# Patient Record
Sex: FEMALE | Race: WHITE
[De-identification: names, ages, dates, MRNs, and addresses within clinical notes are randomized per-mention and may not be internally consistent; named-entity substitution may affect disease eponyms.]

---

## 2019-09-05 ENCOUNTER — HOSPITAL ENCOUNTER (EMERGENCY)
Dept: HOSPITAL 33 - ED | Age: 29
Discharge: HOME | End: 2019-09-05
Payer: COMMERCIAL

## 2019-09-05 VITALS — DIASTOLIC BLOOD PRESSURE: 58 MMHG | SYSTOLIC BLOOD PRESSURE: 105 MMHG | HEART RATE: 118 BPM | OXYGEN SATURATION: 98 %

## 2019-09-05 DIAGNOSIS — J20.9: ICD-10-CM

## 2019-09-05 DIAGNOSIS — E87.6: Primary | ICD-10-CM

## 2019-09-05 LAB
ALBUMIN SERPL-MCNC: 3.9 G/DL (ref 3.5–5)
ALP SERPL-CCNC: 104 U/L (ref 38–126)
ALT SERPL-CCNC: 33 U/L (ref 0–35)
ANION GAP SERPL CALC-SCNC: 12 MEQ/L (ref 5–15)
AST SERPL QL: 24 U/L (ref 14–36)
BASOPHILS # BLD AUTO: 0.01 10*3/UL (ref 0–0.4)
BASOPHILS NFR BLD AUTO: 0.2 % (ref 0–0.4)
BILIRUB BLD-MCNC: 0.4 MG/DL (ref 0.2–1.3)
BUN SERPL-MCNC: 9 MG/DL (ref 7–17)
CALCIUM SPEC-MCNC: 9.1 MG/DL (ref 8.4–10.2)
CHLORIDE SERPL-SCNC: 108 MMOL/L (ref 98–107)
CO2 SERPL-SCNC: 24 MMOL/L (ref 22–30)
CREAT SERPL-MCNC: 0.2 MG/DL (ref 0.52–1.04)
EOSINOPHIL # BLD AUTO: 0.07 10*3/UL (ref 0–0.5)
GLUCOSE SERPL-MCNC: 112 MG/DL (ref 74–106)
GRANULOCYTES # BLD AUTO: 4.11 10*3/UL (ref 1.4–6.9)
HCT VFR BLD AUTO: 36.5 % (ref 35–47)
HGB BLD-MCNC: 11.7 GM/DL (ref 12–16)
LYMPHOCYTES # SPEC AUTO: 0.83 10*3/UL (ref 1–4.6)
MCH RBC QN AUTO: 28.1 PG (ref 26–32)
MCHC RBC AUTO-ENTMCNC: 32.1 G/DL (ref 32–36)
MONOCYTES # BLD AUTO: 0.2 10*3/UL (ref 0–1.3)
NEUTROPHILS NFR BLD AUTO: 78.8 % (ref 36–66)
PLATELET # BLD AUTO: 204 K/MM3 (ref 150–450)
POTASSIUM SERPLBLD-SCNC: 3.2 MMOL/L (ref 3.5–5.1)
PROT SERPL-MCNC: 7.6 G/DL (ref 6.3–8.2)
RBC # BLD AUTO: 4.15 M/MM3 (ref 4.1–5.4)
SODIUM SERPL-SCNC: 141 MMOL/L (ref 137–145)
WBC # BLD AUTO: 5.2 K/MM3 (ref 4–10.5)

## 2019-09-05 PROCEDURE — 80053 COMPREHEN METABOLIC PANEL: CPT

## 2019-09-05 PROCEDURE — 94640 AIRWAY INHALATION TREATMENT: CPT

## 2019-09-05 PROCEDURE — 36415 COLL VENOUS BLD VENIPUNCTURE: CPT

## 2019-09-05 PROCEDURE — 85025 COMPLETE CBC W/AUTO DIFF WBC: CPT

## 2019-09-05 PROCEDURE — 83605 ASSAY OF LACTIC ACID: CPT

## 2019-09-05 PROCEDURE — 71045 X-RAY EXAM CHEST 1 VIEW: CPT

## 2019-09-05 PROCEDURE — 99284 EMERGENCY DEPT VISIT MOD MDM: CPT

## 2019-09-05 RX ADMIN — IPRATROPIUM BROMIDE AND ALBUTEROL SULFATE ONE ML: .5; 3 SOLUTION RESPIRATORY (INHALATION) at 05:22

## 2019-09-05 RX ADMIN — IPRATROPIUM BROMIDE AND ALBUTEROL SULFATE ONE ML: .5; 3 SOLUTION RESPIRATORY (INHALATION) at 04:01

## 2019-09-05 RX ADMIN — POTASSIUM CHLORIDE ONE MEQ: 10 TABLET, EXTENDED RELEASE ORAL at 06:03

## 2019-09-05 NOTE — XRAY
Indication: Dyspnea.



Comparison: February 22, 2016.



Portable chest remains clear.  Heart is not enlarged.  Bony thorax intact

again with left shoulder arthroplasty.  No new/acute findings.

## 2019-09-05 NOTE — ERPHSYRPT
- History of Present Illness


Time Seen by Provider: 09/05/19 03:40


Exam Limitations: no limitations


Patient Subjective Stated Complaint: Pt states, "I've been having trouble 

breathing, been sob off and on for a week and a half and it got alot worse 

tonight".


Triage Nursing Assessment: pt brought in by ambulance to rm 5, pt alert and 

oriented x3, pleasant.  Lungs coarse with rhonchi noted throughout ant and 

post.  pt on rm air, O2 sats 97%.  Heart tones reg, abd soft/round with active 

bs x4 quad.  Pt is a paraplegic, has urostomy tube draining clear, yellow urine.


Physician History: 





patient has had increasing shortness of breath over the past 2 days, with 

increasing wheezing over the past day.  Patient was brought into the emergency 

department by EMS where she received a breathing treatment and IV steroids.  

Patient has tried albuterol treatments at home with some relief.  Patient is a 

smoker.


Timing/Duration: day(s), gradual onset, worse


Activities at Onset: rest


Severity of Dyspnea-Max: moderate


Severity of Dyspnea-Current: none


Possible Cause: no prior episodes


Modifying Factors: Improves With: albuterol nebulizer, deep breath


Associated Symptoms: cough, wheezing, tightness, No chest pain/discomfort, No 

edema, No fever, No loss of appetite, No lightheadedness, No weakness, No 

hemoptysis, No dizziness, No lightheadedness, No painful breathing, No 

productive cough


Allergies/Adverse Reactions: 








tramadol Allergy (Intermediate, Verified 07/30/16 00:22)


 


 SEIZURES 


duloxetine HCl [From Cymbalta] Allergy (Verified 07/30/16 00:22)


 


latex Allergy (Verified 07/30/16 00:22)


 





Home Medications: 








Amphet Asp/Amphet/D-Amphet [Adderall 30 mg Tablet] 30 mg PO DAILY 03/23/15 [

History]


Pregabalin [Lyrica] 300 mg PO BID 03/23/15 [History]


Baclofen 10 mg*** [Lioresal 10 mg***] 10 mg PO QID 04/29/15 [History]


Ondansetron [Zofran Odt] 8 mg PO Q6HPRN PRN 10/23/15 [History]


Polyethylene Glycol 3350 17 gm [Miralax Powder 17GM PACKET***] 17 gm PO DAILY 

PRN PRN 10/23/15 [History]


Albuterol 2.5 mg/3 ml Neb*** [Proventil 2.5 mg/3 ml Neb***] 2.5 mg IH QIDPRN 

PRN 02/22/16 [History]


Albuterol Common Canister*** [Proventil Common Canister***] 1 puff IH QIDPRN 

PRN 02/22/16 [History]





Hx Tetanus, Diphtheria Vaccination/Date Given: Yes


Hx Influenza Vaccination/Date Given: No


Hx Pneumococcal Vaccination/Date Given: No


Immunizations Up to Date: Yes





- Review of Systems


Constitutional: No Fever, No Chills


Eyes: No Symptoms, No Discharge, No Eye Pain


Ears, Nose, & Throat: No Symptoms, Nose Congestion, Sinus Drainage, No Throat 

Pain, No Painful Swallowing


Respiratory: Cough, Dyspnea, Wheezing


Cardiac: No Chest Pain, No Edema, No Syncope


Abdominal/Gastrointestinal: No Abdominal Pain, No Nausea, No Vomiting, No 

Diarrhea


Genitourinary Symptoms: No Flank Pain


Musculoskeletal: No Back Pain, No Neck Pain


Skin: No Rash


Neurological: No Dizziness, No Focal Weakness, No Sensory Changes


Psychological: No Symptoms


Endocrine: No Symptoms


Hematologic/Lymphatic: No Easy Bleeding, No Easy Bruising


All Other Systems: Reviewed and Negative





- Past Medical History


Pertinent Past Medical History: Yes


Neurological History: Paralysis


ENT History: No Pertinent History


Cardiac History: No Pertinent History


Respiratory History: Pneumonia


Endocrine Medical History: No Pertinent History


Musculoskeletal History: Other


GI Medical History: Colitis


 History: Other


Psycho-Social History: No Pertinent History


Female Reproductive Disorders: No Pertinent History


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 

spinal cord, urostomy, Hepatitis C





- Past Surgical History


Past Surgical History: Yes


Neuro Surgical History: No Pertinent History


Cardiac: No Pertinent History


Respiratory: No Pertinent History


Gastrointestinal: No Pertinent History


Genitourinary: No Pertinent History


Musculoskeletal: Joint Replacement


Female Surgical History: No Pertinent History


Other Surgical History: DENTAL EXTRACTION - TONSILS - LEFT SHOULDER REPLACEMENT

, MACE AND MUSA PROCEDURE, rt hip replacement





- Social History


Smoking Status: Current every day smoker


How long have you smoked: 6 yrs


Exposure to second hand smoke: Yes


Drug Use: none


Patient Lives Alone: No





- Female History


Hx Last Menstrual Period: 08/31/19


Hx Pregnant Now: No





- Nursing Vital Signs


Nursing Vital Signs: 


 Initial Vital Signs











Temperature  98.0 F   09/05/19 03:12


 


Pulse Rate  115 H  09/05/19 03:12


 


Respiratory Rate  20   09/05/19 03:12


 


Blood Pressure  113/65   09/05/19 03:12








 Pain Scale











Pain Intensity                 5

















- Physical Exam


General Appearance: no apparent distress, alert


Eye Exam: PERRL/EOMI


Ears, Nose, Throat Exam: hearing grossly normal, normal ENT inspection, normal 

pharynx, nasal congestion, No sinus pain/drainage, No pharyngeal erythema, No 

tonsillar exudate, No tonsillar swelling


Neck Exam: normal inspection, supple, full range of motion, No JVD


Respiratory Exam: airway intact, wheezing, No respiratory distress, No 

diminished breath sounds, No accessory muscle use, No prolonged expirations, No 

crackles/rales, No rhonchi, No stridor


Cardiovascular/Chest Exam: normal heart sounds, regular rate/rhythm, normal 

peripheral pulses


Abdominal/Gastrointestinal Exam: soft, No tenderness, No distention, No mass


Extremity Exam: normal inspection, no calf tenderness, no pedal edema


Neurologic Exam: alert, oriented x 3, cooperative, CNs II-XII nml as tested, 

sensation nml, No motor deficits


Skin Exam: normal color, warm, No dry


**SpO2 Interpretation**: normal


SpO2: 97


O2 Delivery: Room Air





- Course


Nursing assessment & vital signs reviewed: Yes





- Radiology Exams


  ** Chest


X-ray Interpretation: Interpreted by me, Reviewed by me, Negative, No Pneumonia

, Nml Heart Size, No Infiltrates, Nml Mediastinum, Nml Soft Tissues


Ordered Tests: 


 Active Orders 24 hr











 Category Date Time Status


 


 CHEST 1 VIEW (PORTABLE) Stat Exams  09/05/19 03:48 Ordered


 


 CBC W DIFF Stat Lab  09/05/19 04:59 Completed


 


 CMP Stat Lab  09/05/19 04:59 Completed


 


 Lactic Acid Stat Lab  09/05/19 05:15 Completed


 


 Respiratory Therapy Assessment DAILY RT  09/05/19 04:04 Completed


 


 Respiratory Therapy Assessment DAILY RT  09/05/19 05:26 Completed








Medication Summary














Discontinued Medications














Generic Name Dose Route Start Last Admin





  Trade Name Freq  PRN Reason Stop Dose Admin


 


Albuterol Sulfate  Confirm  09/05/19 03:59  





  Proventil 2.5 Mg/3 Ml Neb***  Administered  09/05/19 04:00  





  Dose   





  5 mg   





  IH   





  .STK-MED ONE   





     





     





     





     


 


Albuterol/Ipratropium  6 ml  09/05/19 03:47  09/05/19 04:01





  Duoneb 0.5-3 Mg/3 Ml Neb**  IH  09/05/19 03:48  6 ml





  STAT ONE   Administration





     





     





     





     


 


Albuterol/Ipratropium  3 ml  09/05/19 04:38  09/05/19 05:22





  Duoneb 0.5-3 Mg/3 Ml Neb**  IH  09/05/19 04:39  3 ml





  STAT ONE   Administration





     





     





     





     


 


Albuterol/Ipratropium  Confirm  09/05/19 05:17  





  Duoneb 0.5-3 Mg/3 Ml Neb**  Administered  09/05/19 05:18  





  Dose   





  3 ml   





  IH   





  .STK-MED ONE   





     





     





     





     


 


Potassium Chloride  40 meq  09/05/19 05:22  





  Klor Con 10 Meq***  PO  09/05/19 05:23  





  STAT ONE   





     





     





     





     











Lab/Rad Data: 


 Laboratory Result Diagrams





 09/05/19 04:59 





 09/05/19 04:59 





 Laboratory Results











  09/05/19 09/05/19 09/05/19 Range/Units





  05:15 04:59 04:59 


 


WBC    5.2  (4.0-10.5)  K/mm3


 


RBC    4.15  (4.1-5.4)  M/mm3


 


Hgb    11.7 L  (12.0-16.0)  gm/dl


 


Hct    36.5  (35-47)  %


 


MCV    88.0  ()  fl


 


MCH    28.1  (26-32)  pg


 


MCHC    32.1  (32-36)  g/dl


 


RDW    15.7 H  (11.5-14.0)  %


 


Plt Count    204  (150-450)  K/mm3


 


MPV    10.9 H  (6-9.5)  fl


 


Gran %    78.8 H  (36.0-66.0)  %


 


Eos # (Auto)    0.07  (0-0.5)  


 


Absolute Lymphs (auto)    0.83 L  (1.0-4.6)  


 


Absolute Monos (auto)    0.20  (0.0-1.3)  


 


Lymphocytes %    15.9 L  (24.0-44.0)  %


 


Monocytes %    3.8  (0.0-12.0)  %


 


Eosinophils %    1.3  (0.00-5.0)  %


 


Basophils %    0.2  (0.0-0.4)  %


 


Absolute Granulocytes    4.11  (1.4-6.9)  


 


Basophils #    0.01  (0-0.4)  


 


Sodium   141   (137-145)  mmol/L


 


Potassium   3.2 L   (3.5-5.1)  mmol/L


 


Chloride   108 H   ()  mmol/L


 


Carbon Dioxide   24   (22-30)  mmol/L


 


Anion Gap   12.0   (5-15)  MEQ/L


 


BUN   9   (7-17)  mg/dL


 


Creatinine   0.20 L   (0.52-1.04)  mg/dL


 


Estimated GFR   > 60.0   ML/MIN


 


Glucose   112 H   ()  mg/dL


 


Lactic Acid  1.0    (0.4-2.0)  


 


Calcium   9.1   (8.4-10.2)  mg/dL


 


Total Bilirubin   0.40   (0.2-1.3)  mg/dL


 


AST   24   (14-36)  U/L


 


ALT   33   (0-35)  U/L


 


Alkaline Phosphatase   104   ()  U/L


 


Serum Total Protein   7.6   (6.3-8.2)  g/dL


 


Albumin   3.9   (3.5-5.0)  g/dL














- Progress


Progress: improved, re-examined


Air Movement: good


Progress Note: 





09/05/19 04:40


improved air flow throughout bilateral positive expiratory wheeze still present

; ppatient has no respiratory distress, no tachypnea, and no hypoxia


09/05/19 05:24


improved airflow with less wheeze.  Pulse down to 100.  Potassium replacement 

given due to K at 3.2


Blood Culture(s) Obtained: No


Antibiotics given: No


Counseled pt/family regarding: lab results, diagnosis, need for follow-up, rad 

results, smoking cessation





- Departure


Departure Disposition: Home


Clinical Impression: 


 Hypokalemia





Acute bronchitis


Qualifiers:


 Bronchitis organism: unspecified organism Qualified Code(s): J20.9 - Acute 

bronchitis, unspecified





Condition: Good


Critical Care Time: No


Referrals: 


SOPH[facility] TITUS [ACTIVE STAFF] - 09/06/19


Instructions:  Acute Bronchitis, Adult (DC), Hypokalemia (DC), Shortness of 

Breath (Dyspnea) (DC), Quitting Smoking


Additional Instructions: 


rreturn immediately back to the emergency room if any new shortness of breath, 

new fever, no chest pain, new bowel pain, new back pain, or any other 

concerning signs or symptoms that were not present occur for immediate 

reevaluation in the emergency department


Prescriptions: 


Albuterol Sulfate [Proair Hfa] 8.5 gm IH Q4H PRN PRN #1 [id number]


 PRN Reason: Wheezing/Chest Congestion


Albuterol/Ipratropium 3ml Neb* [DUONEB 0.5-3 MG/3 ml Neb**] 3 ml NEBULIZE Q4H 

PRN PRN #1 box


 PRN Reason: Wheezing/Chest Congestion


Prednisone 20 mg*** [Deltasone 20 mg***] 60 mg PO DAILY PRN #9 tablet


 PRN Reason: Sore Throat Relief


Prednisone 20 mg*** [Deltasone 20 mg***] 60 mg PO DAILY #9 tablet

## 2020-06-13 ENCOUNTER — HOSPITAL ENCOUNTER (EMERGENCY)
Dept: HOSPITAL 33 - ED | Age: 30
Discharge: ANOTHER HOSPITAL | End: 2020-06-13
Payer: COMMERCIAL

## 2020-06-13 VITALS — SYSTOLIC BLOOD PRESSURE: 162 MMHG | HEART RATE: 81 BPM | OXYGEN SATURATION: 96 % | DIASTOLIC BLOOD PRESSURE: 104 MMHG

## 2020-06-13 DIAGNOSIS — Z72.0: ICD-10-CM
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
## 2020-06-13 DIAGNOSIS — N39.0: ICD-10-CM

## 2020-06-13 DIAGNOSIS — T42.8X1A: Primary | ICD-10-CM

## 2020-06-13 DIAGNOSIS — R41.82: ICD-10-CM

## 2020-06-13 DIAGNOSIS — R56.9: ICD-10-CM

## 2020-06-13 LAB
ALBUMIN SERPL-MCNC: 4.8 G/DL (ref 3.5–5)
ALP SERPL-CCNC: 239 U/L (ref 38–126)
ALT SERPL-CCNC: 23 U/L (ref 0–35)
AMPHETAMINES UR QL: NEGATIVE
ANION GAP SERPL CALC-SCNC: 17.2 MEQ/L (ref 5–15)
APAP SPEC-MCNC: < 10 UG/ML (ref 10–30)
AST SERPL QL: 32 U/L (ref 14–36)
BARBITURATES UR QL: NEGATIVE
BASOPHILS # BLD AUTO: 0.02 10*3/UL (ref 0–0.4)
BASOPHILS NFR BLD AUTO: 0.2 % (ref 0–0.4)
BENZODIAZ UR QL SCN: POSITIVE
BILIRUB BLD-MCNC: 0.6 MG/DL (ref 0.2–1.3)
BUN SERPL-MCNC: 11 MG/DL (ref 7–17)
CALCIUM SPEC-MCNC: 10.2 MG/DL (ref 8.4–10.2)
CHLORIDE SERPL-SCNC: 109 MMOL/L (ref 98–107)
CO2 SERPL-SCNC: 26 MMOL/L (ref 22–30)
COCAINE UR QL SCN: NEGATIVE
CREAT SERPL-MCNC: 0.25 MG/DL (ref 0.52–1.04)
CRYSTALS UNIDENTIFIED: (no result) /HPF
EOSINOPHIL # BLD AUTO: 0.01 10*3/UL (ref 0–0.5)
ETHANOL SERPL-MCNC: < 10 MG/DL (ref 0–10)
GLUCOSE SERPL-MCNC: 130 MG/DL (ref 74–106)
GLUCOSE UR-MCNC: NEGATIVE MG/DL
HCT VFR BLD AUTO: 47.8 % (ref 35–47)
HGB BLD-MCNC: 15.5 GM/DL (ref 12–16)
LYMPHOCYTES # SPEC AUTO: 1.18 10*3/UL (ref 1–4.6)
MCH RBC QN AUTO: 28 PG (ref 26–32)
MCHC RBC AUTO-ENTMCNC: 32.4 G/DL (ref 32–36)
METHADONE UR QL: NEGATIVE
MONOCYTES # BLD AUTO: 0.48 10*3/UL (ref 0–1.3)
OPIATES UR QL: POSITIVE
PCP UR QL CFM>20 NG/ML: NEGATIVE
PLATELET # BLD AUTO: 464 K/MM3 (ref 150–450)
POTASSIUM SERPLBLD-SCNC: 3.8 MMOL/L (ref 3.5–5.1)
PROT SERPL-MCNC: 9.5 G/DL (ref 6.3–8.2)
PROT UR STRIP-MCNC: 100 MG/DL
RBC # BLD AUTO: 5.53 M/MM3 (ref 4.1–5.4)
RBC #/AREA URNS HPF: (no result) /HPF (ref 0–2)
SALICYLATES SERPL-MCNC: < 1 MG/DL (ref 2–20)
SODIUM SERPL-SCNC: 148 MMOL/L (ref 137–145)
THC UR QL SCN: POSITIVE
WBC # BLD AUTO: 10.1 K/MM3 (ref 4–10.5)
WBC #/AREA URNS HPF: (no result) /HPF (ref 0–5)

## 2020-06-13 PROCEDURE — 99291 CRITICAL CARE FIRST HOUR: CPT

## 2020-06-13 PROCEDURE — 96360 HYDRATION IV INFUSION INIT: CPT

## 2020-06-13 PROCEDURE — 71045 X-RAY EXAM CHEST 1 VIEW: CPT

## 2020-06-13 PROCEDURE — 83605 ASSAY OF LACTIC ACID: CPT

## 2020-06-13 PROCEDURE — 87186 SC STD MICRODIL/AGAR DIL: CPT

## 2020-06-13 PROCEDURE — 80053 COMPREHEN METABOLIC PANEL: CPT

## 2020-06-13 PROCEDURE — 87040 BLOOD CULTURE FOR BACTERIA: CPT

## 2020-06-13 PROCEDURE — G0480 DRUG TEST DEF 1-7 CLASSES: HCPCS

## 2020-06-13 PROCEDURE — 82962 GLUCOSE BLOOD TEST: CPT

## 2020-06-13 PROCEDURE — 96374 THER/PROPH/DIAG INJ IV PUSH: CPT

## 2020-06-13 PROCEDURE — 96365 THER/PROPH/DIAG IV INF INIT: CPT

## 2020-06-13 PROCEDURE — 87077 CULTURE AEROBIC IDENTIFY: CPT

## 2020-06-13 PROCEDURE — 70450 CT HEAD/BRAIN W/O DYE: CPT

## 2020-06-13 PROCEDURE — 36000 PLACE NEEDLE IN VEIN: CPT

## 2020-06-13 PROCEDURE — 96375 TX/PRO/DX INJ NEW DRUG ADDON: CPT

## 2020-06-13 PROCEDURE — 80307 DRUG TEST PRSMV CHEM ANLYZR: CPT

## 2020-06-13 PROCEDURE — 36415 COLL VENOUS BLD VENIPUNCTURE: CPT

## 2020-06-13 PROCEDURE — 99285 EMERGENCY DEPT VISIT HI MDM: CPT

## 2020-06-13 PROCEDURE — 81001 URINALYSIS AUTO W/SCOPE: CPT

## 2020-06-13 PROCEDURE — 85025 COMPLETE CBC W/AUTO DIFF WBC: CPT

## 2020-06-13 PROCEDURE — 87086 URINE CULTURE/COLONY COUNT: CPT

## 2020-06-13 PROCEDURE — 93005 ELECTROCARDIOGRAM TRACING: CPT
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
## 2020-06-13 NOTE — XRAY
Indication: Cough.



Comparison: September 5, 2019.



Portable chest remains clear.  Heart is not enlarged.  Bony thorax intact with

stable left shoulder arthroplasty and new multiple monitoring leads.



Impression: Nonacute chest.

## 2020-06-13 NOTE — XRAY
Indication: Acute mental status change.  Seizure.



Multiple contiguous axial images obtained through the head without contrast.



Comparison: October 12, 2012.



Study slightly degraded by motion artifact even with repeat CT.

Ventriculosulcal pattern appears symmetric.  No acute intracranial hemorrhage,

abnormal extra-axial fluid collection, or mass effect.  Fourth ventricle is

midline without hydrocephalus.  Gray-white matter differentiation preserved.

Bony calvarium grossly intact.  Visualized paranasal sinuses and mastoid air

cells grossly clear.



Impression: Motion artifact.  No gross acute intracranial abnormalities.



Comment: Preliminary interpretation was made by [facility].  No critical discrepancy.

## 2020-07-18 ENCOUNTER — HOSPITAL ENCOUNTER (EMERGENCY)
Dept: HOSPITAL 33 - ED | Age: 30
Discharge: HOME | End: 2020-07-18
Payer: COMMERCIAL

## 2020-07-18 VITALS — SYSTOLIC BLOOD PRESSURE: 122 MMHG | DIASTOLIC BLOOD PRESSURE: 79 MMHG | OXYGEN SATURATION: 97 % | HEART RATE: 79 BPM

## 2020-07-18 DIAGNOSIS — F19.10: Primary | ICD-10-CM

## 2020-07-18 DIAGNOSIS — G40.909: ICD-10-CM

## 2020-07-18 DIAGNOSIS — R55: ICD-10-CM

## 2020-07-18 DIAGNOSIS — R74.0: ICD-10-CM

## 2020-07-18 DIAGNOSIS — R41.82: ICD-10-CM

## 2020-07-18 LAB
ALBUMIN SERPL-MCNC: 3.6 G/DL (ref 3.5–5)
ALP SERPL-CCNC: 314 U/L (ref 38–126)
ALT SERPL-CCNC: 145 U/L (ref 0–35)
AMPHETAMINES UR QL: POSITIVE
ANION GAP SERPL CALC-SCNC: 8 MEQ/L (ref 5–15)
AST SERPL QL: 145 U/L (ref 14–36)
BARBITURATES UR QL: NEGATIVE
BASOPHILS # BLD AUTO: 0.02 10*3/UL (ref 0–0.4)
BASOPHILS NFR BLD AUTO: 0.4 % (ref 0–0.4)
BENZODIAZ UR QL SCN: POSITIVE
BILIRUB BLD-MCNC: 0.3 MG/DL (ref 0.2–1.3)
BUN SERPL-MCNC: 13 MG/DL (ref 7–17)
CALCIUM SPEC-MCNC: 9.3 MG/DL (ref 8.4–10.2)
CHLORIDE SERPL-SCNC: 107 MMOL/L (ref 98–107)
CO2 SERPL-SCNC: 30 MMOL/L (ref 22–30)
COCAINE UR QL SCN: NEGATIVE
CREAT SERPL-MCNC: 0.28 MG/DL (ref 0.52–1.04)
EOSINOPHIL # BLD AUTO: 0.32 10*3/UL (ref 0–0.5)
ETHANOL SERPL-MCNC: < 10 MG/DL (ref 0–10)
GLUCOSE SERPL-MCNC: 79 MG/DL (ref 74–106)
GLUCOSE UR-MCNC: NEGATIVE MG/DL
HCT VFR BLD AUTO: 37.1 % (ref 35–47)
HGB BLD-MCNC: 11.5 GM/DL (ref 12–16)
LIPASE SERPL-CCNC: 29 U/L (ref 23–300)
LYMPHOCYTES # SPEC AUTO: 1.08 10*3/UL (ref 1–4.6)
MAGNESIUM SERPL-MCNC: 2.1 MG/DL (ref 1.6–2.3)
MCH RBC QN AUTO: 27.6 PG (ref 26–32)
MCHC RBC AUTO-ENTMCNC: 31 G/DL (ref 32–36)
METHADONE UR QL: NEGATIVE
MONOCYTES # BLD AUTO: 0.42 10*3/UL (ref 0–1.3)
OPIATES UR QL: POSITIVE
PCP UR QL CFM>20 NG/ML: NEGATIVE
PLATELET # BLD AUTO: 286 K/MM3 (ref 150–450)
POTASSIUM SERPLBLD-SCNC: 4.1 MMOL/L (ref 3.5–5.1)
PROT SERPL-MCNC: 7.3 G/DL (ref 6.3–8.2)
PROT UR STRIP-MCNC: 100 MG/DL
RBC # BLD AUTO: 4.17 M/MM3 (ref 4.1–5.4)
RBC #/AREA URNS HPF: >101 /HPF (ref 0–2)
SODIUM SERPL-SCNC: 141 MMOL/L (ref 137–145)
THC UR QL SCN: NEGATIVE
WBC # BLD AUTO: 4.9 K/MM3 (ref 4–10.5)
WBC #/AREA URNS HPF: (no result) /HPF (ref 0–5)

## 2020-07-18 PROCEDURE — 87040 BLOOD CULTURE FOR BACTERIA: CPT

## 2020-07-18 PROCEDURE — G0480 DRUG TEST DEF 1-7 CLASSES: HCPCS

## 2020-07-18 PROCEDURE — 85025 COMPLETE CBC W/AUTO DIFF WBC: CPT

## 2020-07-18 PROCEDURE — 96360 HYDRATION IV INFUSION INIT: CPT

## 2020-07-18 PROCEDURE — 81001 URINALYSIS AUTO W/SCOPE: CPT

## 2020-07-18 PROCEDURE — 83605 ASSAY OF LACTIC ACID: CPT

## 2020-07-18 PROCEDURE — 99291 CRITICAL CARE FIRST HOUR: CPT

## 2020-07-18 PROCEDURE — 96374 THER/PROPH/DIAG INJ IV PUSH: CPT

## 2020-07-18 PROCEDURE — 87086 URINE CULTURE/COLONY COUNT: CPT

## 2020-07-18 PROCEDURE — 80053 COMPREHEN METABOLIC PANEL: CPT

## 2020-07-18 PROCEDURE — 83690 ASSAY OF LIPASE: CPT

## 2020-07-18 PROCEDURE — 84703 CHORIONIC GONADOTROPIN ASSAY: CPT

## 2020-07-18 PROCEDURE — 70450 CT HEAD/BRAIN W/O DYE: CPT

## 2020-07-18 PROCEDURE — 96375 TX/PRO/DX INJ NEW DRUG ADDON: CPT

## 2020-07-18 PROCEDURE — 83735 ASSAY OF MAGNESIUM: CPT

## 2020-07-18 PROCEDURE — 71045 X-RAY EXAM CHEST 1 VIEW: CPT

## 2020-07-18 PROCEDURE — 84484 ASSAY OF TROPONIN QUANT: CPT

## 2020-07-18 PROCEDURE — 94640 AIRWAY INHALATION TREATMENT: CPT

## 2020-07-18 PROCEDURE — 93005 ELECTROCARDIOGRAM TRACING: CPT

## 2020-07-18 PROCEDURE — 99285 EMERGENCY DEPT VISIT HI MDM: CPT

## 2020-07-18 PROCEDURE — 36000 PLACE NEEDLE IN VEIN: CPT

## 2020-07-18 PROCEDURE — 80307 DRUG TEST PRSMV CHEM ANLYZR: CPT

## 2020-07-18 PROCEDURE — 36415 COLL VENOUS BLD VENIPUNCTURE: CPT
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
## 2020-07-19 NOTE — XRAY
Indication: Acute mental status change.  Seizure.  Paraplegic.



Multiple contiguous axial images obtained through the head without contrast.



Comparison: June 13, 2020.



Again normal appearing brain parenchyma, ventricles, and bony calvarium.

Visualized paranasal sinuses and mastoid air cells are clear.



Impression: Continued normal CT head without contrast exam.



Comment: Preliminary interpretation was made by [facility].  No critical discrepancy.

## 2020-07-19 NOTE — XRAY
Indication: Acute mental status change.  Seizure.  Paraplegic.



Comparison: June 13, 2020.



Portable chest again demonstrates normal heart and lungs.  Bony thorax intact

again with left shoulder arthroplasty.  No new/acute findings.